# Patient Record
Sex: MALE | Race: BLACK OR AFRICAN AMERICAN | Employment: OTHER | URBAN - METROPOLITAN AREA
[De-identification: names, ages, dates, MRNs, and addresses within clinical notes are randomized per-mention and may not be internally consistent; named-entity substitution may affect disease eponyms.]

---

## 2019-04-24 ENCOUNTER — ANESTHESIA (OUTPATIENT)
Dept: ENDOSCOPY | Age: 64
End: 2019-04-24
Payer: MEDICARE

## 2019-04-24 ENCOUNTER — ANESTHESIA EVENT (OUTPATIENT)
Dept: ENDOSCOPY | Age: 64
End: 2019-04-24
Payer: MEDICARE

## 2019-04-24 ENCOUNTER — HOSPITAL ENCOUNTER (OUTPATIENT)
Age: 64
Setting detail: OUTPATIENT SURGERY
Discharge: HOME | End: 2019-04-24
Attending: INTERNAL MEDICINE | Admitting: INTERNAL MEDICINE
Payer: MEDICARE

## 2019-04-24 VITALS
WEIGHT: 198 LBS | SYSTOLIC BLOOD PRESSURE: 128 MMHG | OXYGEN SATURATION: 97 % | TEMPERATURE: 97.8 F | RESPIRATION RATE: 16 BRPM | DIASTOLIC BLOOD PRESSURE: 73 MMHG | HEART RATE: 68 BPM

## 2019-04-24 PROCEDURE — 76040000019: Performed by: INTERNAL MEDICINE

## 2019-04-24 PROCEDURE — 77030027957 HC TBNG IRR ENDOGTR BUSS -B: Performed by: INTERNAL MEDICINE

## 2019-04-24 PROCEDURE — 76060000031 HC ANESTHESIA FIRST 0.5 HR: Performed by: INTERNAL MEDICINE

## 2019-04-24 PROCEDURE — 74011250636 HC RX REV CODE- 250/636

## 2019-04-24 RX ORDER — PROPOFOL 10 MG/ML
INJECTION, EMULSION INTRAVENOUS AS NEEDED
Status: DISCONTINUED | OUTPATIENT
Start: 2019-04-24 | End: 2019-04-24 | Stop reason: HOSPADM

## 2019-04-24 RX ORDER — MIDAZOLAM HYDROCHLORIDE 1 MG/ML
.25-5 INJECTION, SOLUTION INTRAMUSCULAR; INTRAVENOUS
Status: DISCONTINUED | OUTPATIENT
Start: 2019-04-24 | End: 2019-04-24 | Stop reason: HOSPADM

## 2019-04-24 RX ORDER — DEXTROMETHORPHAN/PSEUDOEPHED 2.5-7.5/.8
1.2 DROPS ORAL
Status: DISCONTINUED | OUTPATIENT
Start: 2019-04-24 | End: 2019-04-24 | Stop reason: HOSPADM

## 2019-04-24 RX ORDER — FLUMAZENIL 0.1 MG/ML
0.2 INJECTION INTRAVENOUS
Status: DISCONTINUED | OUTPATIENT
Start: 2019-04-24 | End: 2019-04-24 | Stop reason: HOSPADM

## 2019-04-24 RX ORDER — ATROPINE SULFATE 0.1 MG/ML
0.5 INJECTION INTRAVENOUS
Status: DISCONTINUED | OUTPATIENT
Start: 2019-04-24 | End: 2019-04-24 | Stop reason: HOSPADM

## 2019-04-24 RX ORDER — SODIUM CHLORIDE 0.9 % (FLUSH) 0.9 %
5-40 SYRINGE (ML) INJECTION AS NEEDED
Status: DISCONTINUED | OUTPATIENT
Start: 2019-04-24 | End: 2019-04-24 | Stop reason: HOSPADM

## 2019-04-24 RX ORDER — DOXAZOSIN 4 MG/1
TABLET ORAL DAILY
COMMUNITY

## 2019-04-24 RX ORDER — SODIUM CHLORIDE 9 MG/ML
INJECTION, SOLUTION INTRAVENOUS
Status: DISCONTINUED | OUTPATIENT
Start: 2019-04-24 | End: 2019-04-24 | Stop reason: HOSPADM

## 2019-04-24 RX ORDER — SODIUM CHLORIDE 9 MG/ML
50 INJECTION, SOLUTION INTRAVENOUS CONTINUOUS
Status: DISCONTINUED | OUTPATIENT
Start: 2019-04-24 | End: 2019-04-24 | Stop reason: HOSPADM

## 2019-04-24 RX ORDER — LIDOCAINE HYDROCHLORIDE 20 MG/ML
INJECTION, SOLUTION EPIDURAL; INFILTRATION; INTRACAUDAL; PERINEURAL AS NEEDED
Status: DISCONTINUED | OUTPATIENT
Start: 2019-04-24 | End: 2019-04-24 | Stop reason: HOSPADM

## 2019-04-24 RX ORDER — FINASTERIDE 5 MG/1
5 TABLET, FILM COATED ORAL DAILY
COMMUNITY

## 2019-04-24 RX ORDER — SODIUM CHLORIDE 0.9 % (FLUSH) 0.9 %
5-40 SYRINGE (ML) INJECTION EVERY 8 HOURS
Status: DISCONTINUED | OUTPATIENT
Start: 2019-04-24 | End: 2019-04-24 | Stop reason: HOSPADM

## 2019-04-24 RX ORDER — EPINEPHRINE 0.1 MG/ML
1 INJECTION INTRACARDIAC; INTRAVENOUS
Status: DISCONTINUED | OUTPATIENT
Start: 2019-04-24 | End: 2019-04-24 | Stop reason: HOSPADM

## 2019-04-24 RX ORDER — NALOXONE HYDROCHLORIDE 0.4 MG/ML
0.4 INJECTION, SOLUTION INTRAMUSCULAR; INTRAVENOUS; SUBCUTANEOUS
Status: DISCONTINUED | OUTPATIENT
Start: 2019-04-24 | End: 2019-04-24 | Stop reason: HOSPADM

## 2019-04-24 RX ORDER — FENTANYL CITRATE 50 UG/ML
100 INJECTION, SOLUTION INTRAMUSCULAR; INTRAVENOUS
Status: DISCONTINUED | OUTPATIENT
Start: 2019-04-24 | End: 2019-04-24 | Stop reason: HOSPADM

## 2019-04-24 RX ADMIN — PROPOFOL 100 MG: 10 INJECTION, EMULSION INTRAVENOUS at 07:33

## 2019-04-24 RX ADMIN — PROPOFOL 50 MG: 10 INJECTION, EMULSION INTRAVENOUS at 07:39

## 2019-04-24 RX ADMIN — PROPOFOL 50 MG: 10 INJECTION, EMULSION INTRAVENOUS at 07:36

## 2019-04-24 RX ADMIN — PROPOFOL 50 MG: 10 INJECTION, EMULSION INTRAVENOUS at 07:47

## 2019-04-24 RX ADMIN — PROPOFOL 50 MG: 10 INJECTION, EMULSION INTRAVENOUS at 07:43

## 2019-04-24 RX ADMIN — SODIUM CHLORIDE: 9 INJECTION, SOLUTION INTRAVENOUS at 07:30

## 2019-04-24 RX ADMIN — LIDOCAINE HYDROCHLORIDE 40 MG: 20 INJECTION, SOLUTION EPIDURAL; INFILTRATION; INTRACAUDAL; PERINEURAL at 07:30

## 2019-04-24 NOTE — ANESTHESIA POSTPROCEDURE EVALUATION
Post-Anesthesia Evaluation and Assessment Patient: Julia Tejeda MRN: [id number]  SSN: [id number] YOB: 1955  Age: 75 y.o. Sex: male I have evaluated the patient and they are stable and ready for discharge from the PACU. Cardiovascular Function/Vital Signs Visit Vitals /78 Pulse 65 Temp 36.5 °C (97.7 °F) Resp 18 Wt 89.8 kg (198 lb) SpO2 97% Patient is status post MAC anesthesia for Procedure(s): 
COLONOSCOPY. Nausea/Vomiting: None Postoperative hydration reviewed and adequate. Pain: 
Pain Scale 1: Numeric (0 - 10) (04/24/19 0805) Pain Intensity 1: 0 (04/24/19 0805) Managed Neurological Status: At baseline Mental Status, Level of Consciousness: Alert and  oriented to person, place, and time Pulmonary Status:  
O2 Device: Room air (04/24/19 0805) Adequate oxygenation and airway patent Complications related to anesthesia: None Post-anesthesia assessment completed. No concerns Signed By: Violet Sumner MD   
 April 24, 2019 Procedure(s): 
COLONOSCOPY. MAC 
 
<BSHSIANPOST> Vitals Value Taken Time /79 4/24/2019  8:09 AM  
Temp 36.5 °C (97.7 °F) 4/24/2019  8:05 AM  
Pulse 63 4/24/2019  8:11 AM  
Resp 14 4/24/2019  8:11 AM  
SpO2 98 % 4/24/2019  8:11 AM  
Vitals shown include unvalidated device data.

## 2019-04-24 NOTE — PROCEDURES
[address]  [address]              :  Jamee Wise MD    Surgical Assistant: None    Implants: None    Referring Provider: Garlin Fothergill, MD    Sedation:  MAC anesthesia Propofol        Prior to the procedure its objectives, risks, consequences and alternatives were discussed with the patient who then elected to proceed. The patient had the opportunity to ask questions and those questions were answered. A physical exam was performed. The heart, lungs, and mental status were examined prior to the procedure and found to be satisfactory for conscious sedation and for the procedure. Conscious sedation was initiated by the physician. Continuous pulse oximetry and blood pressure monitoring were used throughout the procedure. After appropriate analgesia and rectal exam, the colonoscope was passed into the anus and passed to the cecum without difficulty. The prep was good. On slow withdrawal of the scope, the cecum, ascending colon, hepatic flexure, transverse colon, splenic flexure, descending colon, sigmoid and rectum were normal. Retroflexion exam of the rectum was normal. He tolerated the procedure without complication and I recommend a repeat colonoscopy in 10 years. Specimen none    Complications: None. EBL:  None.     Jamee Wise MD  4/24/2019  7:57 AM

## 2019-04-24 NOTE — DISCHARGE INSTRUCTIONS
[address]  174 [facility]          Hamlin Boxer  [id number]  1955    COLON DISCHARGE INSTRUCTIONS    DISCOMFORT:  Redness at IV site- apply warm compress to area; if redness or soreness persist- contact your physician  There may be a slight amount of blood passed from the rectum  Gaseous discomfort- walking, belching will help relieve any discomfort  You may not operate a vehicle for 12 hours  You may not engage in an occupation involving machinery or appliances for rest of today  You may not drink alcoholic beverages for at least 12 hours  Avoid making any critical decisions for at least 24 hour  DIET:   Regular diet. - however -  remember your colon is empty and a heavy meal will produce gas. Avoid these foods:  vegetables, fried / greasy foods, carbonated drinks for today         ACTIVITY:  You may resume your normal daily activities it is recommended that you spend the remainder of the day resting -  avoid any strenuous activity. CALL M.D. ANY SIGN OF:   Increasing pain, nausea, vomiting  Abdominal distension (swelling)  New increased bleeding (oral or rectal)  Fever (chills)  Pain in chest area  Bloody discharge from nose or mouth  Shortness of breath     Follow-up Instructions:   Call Dr. Jocelyne Gooden for any questions or problems. Telephone # [phone]  Should have a repeat colonoscopy in 10 years    Impression:  1. normal colon exam  Get Together Activation    Thank you for requesting access to Get Together. Please follow the instructions below to securely access and download your online medical record. Get Together allows you to send messages to your doctor, view your test results, renew your prescriptions, schedule appointments, and more. How Do I Sign Up? 1. In your internet browser, go to [email or web address]  2. Click on the First Time User? Click Here link in the Sign In box. You will be redirect to the New Member Sign Up page.   3. Enter your [facility] Access Code exactly as it appears below. You will not need to use this code after youve completed the sign-up process. If you do not sign up before the expiration date, you must request a new code. [facility] Access Code: [email or web address]  Expires: 2019  8:11 AM (This is the date your [facility] access code will )    4. Enter the last four digits of your Social Security Number (xxxx) and Date of Birth (mm/dd/yyyy) as indicated and click Submit. You will be taken to the next sign-up page. 5. Create a [employer] ID. This will be your [facility] login ID and cannot be changed, so think of one that is secure and easy to remember. 6. Create a [facility] password. You can change your password at any time. 7. Enter your Password Reset Question and Answer. This can be used at a later time if you forget your password. 8. Enter your e-mail address. You will receive e-mail notification when new information is available in [address]. 9. Click Sign Up. You can now view and download portions of your medical record. 10. Click the Download Summary menu link to download a portable copy of your medical information. Additional Information    If you have questions, please visit the Frequently Asked Questions section of the [facility] website at [email or web address] Remember, [facility] is NOT to be used for urgent needs. For medical emergencies, dial 911.

## 2019-04-24 NOTE — H&P
[address] 
[address] [facility], [address]  Henderson Claudia is a  75 y.o.  male who presents with no symptoms for screening . Past Medical History:  
Diagnosis Date  Hypertension History reviewed. No pertinent surgical history. No Known Allergies Visit Vitals /85 Pulse 71 Temp 98 °F (36.7 °C) Resp 12 Wt 89.8 kg (198 lb) SpO2 98% PHYSICAL EXAM: 
General: WD, WN. Alert, cooperative, no acute distress   
HEENT: NC, Atraumatic. PERRLA, EOMI. Anicteric sclerae. Mallampati score 2 Lungs:  CTA Bilaterally. No Wheezing/Rhonchi/Rales. Heart:  Regular  rhythm,  No murmur (), No Rubs, No Gallops Abdomen: Soft, Non distended, Non tender.  +Bowel sounds, no HSM Extremities: No c/c/e Neurologic:  CN 2-12 gi, Alert and oriented X 3. No acute neurological distress Psych:   Good insight. Not anxious nor agitated. Plan:  
Endoscopic procedure with MAC.  
 
Madeleine Boss MD 
4/24/2019  7:36 AM

## 2019-04-24 NOTE — PROGRESS NOTES
Alois Leyden 1955 
[id number] Situation: 
Verbal report received from:Megan Procedure: Procedure(s): 
COLONOSCOPY Background: 
 
Preoperative diagnosis: screening Postoperative diagnosis: 1. normal colon exam 
 
:  Dr. Juan F Vargas Assistant(s): Endoscopy RN-1: Cristal Calles RN Endoscopy RN-2: Cruz Katz RN Specimens: * No specimens in log * H. Pylori  no Assessment: 
Intra-procedure medications Anesthesia gave intra-procedure sedation and medications, see anesthesia flow sheet yes Intravenous fluids: NS@ Marceil Heck Vital signs stable Abdominal assessment: round and soft Recommendation: 
Discharge patient per MD order Family or Friend Permission to share finding with family or friend yes

## 2019-04-24 NOTE — PROGRESS NOTES

## 2023-02-14 ENCOUNTER — HOSPITAL ENCOUNTER (OUTPATIENT)
Dept: PREADMISSION TESTING | Age: 68
Discharge: HOME | End: 2023-02-14
Payer: MEDICARE

## 2023-02-14 VITALS
DIASTOLIC BLOOD PRESSURE: 80 MMHG | HEART RATE: 73 BPM | SYSTOLIC BLOOD PRESSURE: 156 MMHG | BODY MASS INDEX: 27.77 KG/M2 | TEMPERATURE: 97.7 F | WEIGHT: 205.03 LBS | HEIGHT: 72 IN

## 2023-02-14 LAB
ALBUMIN SERPL-MCNC: 3.9 G/DL (ref 3.5–5)
ALBUMIN/GLOB SERPL: 1.1 (ref 1.1–2.2)
ALP SERPL-CCNC: 58 U/L (ref 45–117)
ALT SERPL-CCNC: 25 U/L (ref 12–78)
ANION GAP SERPL CALC-SCNC: 9 MMOL/L (ref 5–15)
APPEARANCE UR: CLEAR
AST SERPL-CCNC: 23 U/L (ref 15–37)
ATRIAL RATE: 65 BPM
BACTERIA URNS QL MICRO: NEGATIVE /HPF
BILIRUB SERPL-MCNC: 0.8 MG/DL (ref 0.2–1)
BILIRUB UR QL: NEGATIVE
BUN SERPL-MCNC: 16 MG/DL (ref 6–20)
BUN/CREAT SERPL: 14 (ref 12–20)
CALCIUM SERPL-MCNC: 9.4 MG/DL (ref 8.5–10.1)
CALCULATED P AXIS, ECG09: 59 DEGREES
CALCULATED R AXIS, ECG10: 52 DEGREES
CALCULATED T AXIS, ECG11: 72 DEGREES
CHLORIDE SERPL-SCNC: 109 MMOL/L (ref 97–108)
CO2 SERPL-SCNC: 24 MMOL/L (ref 21–32)
COLOR UR: ABNORMAL
CREAT SERPL-MCNC: 1.18 MG/DL (ref 0.7–1.3)
DIAGNOSIS, 93000: NORMAL
EPITH CASTS URNS QL MICRO: ABNORMAL /LPF
GLOBULIN SER CALC-MCNC: 3.7 G/DL (ref 2–4)
GLUCOSE SERPL-MCNC: 109 MG/DL (ref 65–100)
GLUCOSE UR STRIP.AUTO-MCNC: NEGATIVE MG/DL
HGB UR QL STRIP: NEGATIVE
KETONES UR QL STRIP.AUTO: ABNORMAL MG/DL
LEUKOCYTE ESTERASE UR QL STRIP.AUTO: NEGATIVE
NITRITE UR QL STRIP.AUTO: NEGATIVE
P-R INTERVAL, ECG05: 190 MS
PH UR STRIP: 5.5 (ref 5–8)
POTASSIUM SERPL-SCNC: 4 MMOL/L (ref 3.5–5.1)
PROT SERPL-MCNC: 7.6 G/DL (ref 6.4–8.2)
PROT UR STRIP-MCNC: ABNORMAL MG/DL
Q-T INTERVAL, ECG07: 406 MS
QRS DURATION, ECG06: 82 MS
QTC CALCULATION (BEZET), ECG08: 422 MS
RBC #/AREA URNS HPF: ABNORMAL /HPF (ref 0–5)
SODIUM SERPL-SCNC: 142 MMOL/L (ref 136–145)
SP GR UR REFRACTOMETRY: 1.02 (ref 1–1.03)
UA: UC IF INDICATED,UAUC: ABNORMAL
UROBILINOGEN UR QL STRIP.AUTO: 1 EU/DL (ref 0.2–1)
VENTRICULAR RATE, ECG03: 65 BPM
WBC URNS QL MICRO: ABNORMAL /HPF (ref 0–4)

## 2023-02-14 PROCEDURE — 36415 COLL VENOUS BLD VENIPUNCTURE: CPT

## 2023-02-14 PROCEDURE — 81001 URINALYSIS AUTO W/SCOPE: CPT

## 2023-02-14 PROCEDURE — 93005 ELECTROCARDIOGRAM TRACING: CPT

## 2023-02-14 PROCEDURE — 80053 COMPREHEN METABOLIC PANEL: CPT

## 2023-02-14 PROCEDURE — 85025 COMPLETE CBC W/AUTO DIFF WBC: CPT

## 2023-02-14 RX ORDER — DOXAZOSIN 8 MG/1
8 TABLET ORAL EVERY MORNING
COMMUNITY

## 2023-02-14 NOTE — PERIOP NOTES
[address] INSTRUCTIONS    Surgery Date:   2/21/2023    Your surgeon's office or [facility] staff will call you between 4 PM- 8 PM the day before surgery with your arrival time. If your surgery is on a Monday, you will receive a call the preceding Friday. Please report to [facility] Patient Access/Admitting on the 1st floor. Bring your insurance card, photo identification, and any copayment ( if applicable). If you are going home the same day of your surgery, you must have a responsible adult to drive you home. You need to have a responsible adult to stay with you the first 24 hours after surgery and you should not drive a car for 24 hours following your surgery. Nothing to eat or drink after midnight the night before surgery. This includes no water, gum, mints, coffee, juice, etc.  Please note special instructions, if applicable, below for medications. Do NOT drink alcohol or smoke 24 hours before surgery. STOP smoking for 14 days prior as it helps with breathing and healing after surgery. If you are being admitted to the hospital, please leave personal belongings/luggage in your car until you have an assigned hospital room number. Please wear comfortable clothes. Wear your glasses instead of contacts. We ask that all money, jewelry and valuables be left at home. Wear no make up, particularly mascara, the day of surgery. All body piercings, rings, and jewelry need to be removed and left at home. Please remove any nail polish or artificial nails from your fingernails. Please wear your hair loose or down. Please no pony-tails, buns, or any metal hair accessories. If you shower the morning of surgery, please do not apply any lotions or powders afterwards. You may wear deodorant, unless having breast surgery. Do not shave any body area within 24 hours of your surgery. Please follow all instructions to avoid any potential surgical cancellation.   Should your physical condition change, (i.e. fever, cold, flu, etc.) please notify your surgeon as soon as possible. It is important to be on time. If a situation occurs where you may be delayed, please call:  [phone] / [phone] on the day of surgery. The Preadmission Testing staff can be reached at [phone]. Special instructions: N/A      Current Outpatient Medications   Medication Sig    doxazosin (Cardura) 8 mg tablet Take 8 mg by mouth Every morning. finasteride (PROSCAR) 5 mg tablet Take 5 mg by mouth nightly. No current facility-administered medications for this encounter. YOU MUST ONLY TAKE THESE MEDICATIONS THE MORNING OF SURGERY WITH A SIP OF WATER: N/A  MEDICATIONS TO TAKE THE MORNING OF SURGERY ONLY IF NEEDED: N/A  HOLD these prescription medications BEFORE Surgery: DOXAZOSIN, FINASTERIDE  Ask your surgeon/prescribing physician about when/if to STOP taking these medications: N/A  Stop all vitamins, herbal medicines and Aspirin containing products 7 days prior to surgery. Stop any non-steroidal anti-inflammatory drugs (i.e. Ibuprofen, Naproxen, Advil, Aleve) 3 days before surgery. You may take Tylenol. If you are currently taking Plavix, Coumadin, or any other blood-thinning/anticoagulant medication contact your prescribing physician for instructions. Preventing Infections Before and After - Your Surgery    IMPORTANT INSTRUCTIONS      You play an important role in your health and preparation for surgery. To reduce the germs on your skin you will need to shower with CHG soap (Chorhexidine gluconate 4%) two times before surgery. CHG soap (Hibiclens, Hex-A-Clens or store brand)  CHG soap will be provided at your Preadmission Testing (PAT) appointment. If you do not have a PAT appointment before surgery, you may arrange to  CHG soap from our office or purchase CHG soap at a pharmacy, grocery or department store. You need to purchase TWO 4 ounce bottles to use for your 2 showers.     Steps to follow:  Wash your hair with your normal shampoo and your body with regular soap and rinse well to remove shampoo and soap from your skin. Wet a clean washcloth and turn off the shower. Put CHG soap on washcloth and apply to your entire body from the neck down. Do not use on your head, face or private parts(genitals). Do not use CHG soap on open sores, wounds or areas of skin irritation. Wash you body gently for 5 minutes. Do not wash your skin too hard. This soap does not create lather. Pay special attention to your underarms and from your belly button to your feet. Turn the shower back on and rinse well to get CHG soap off your body. Pat your skin dry with a clean, dry towel. Do not apply lotions or moisturizer. Put on clean clothes and sleep on fresh bed sheets and do not allow pets to sleep with you. Shower with CHG soap 2 times before your surgery  The evening before your surgery  The morning of your surgery      Tips to help prevent infections after your surgery:  Protect your surgical wound from germs:  Hand washing is the most important thing you and your caregivers can do to prevent infections. Keep your bandage clean and dry! Do not touch your surgical wound. Use clean, freshly washed towels and washcloths every time you shower; do not share bath linens with others. Until your surgical wound is healed, wear clothing and sleep on bed linens each day that are clean and freshly washed. Do not allow pets to sleep in your bed with you or touch your surgical wound. Do not smoke - smoking delays wound healing. This may be a good time to stop smoking. If you have diabetes, it is important for you to manage your blood sugar levels properly before your surgery as well as after your surgery. Poorly managed blood sugar levels slow down wound healing and prevent you from healing completely.           Patient Information Regarding COVID Restrictions    Day of Procedure    Please park in the parking deck or any designated visitor parking lot. Enter the facility through the Main Entrance of the hospital.  On the day of surgery, please provide the cell phone number of the person who will be waiting for you to the Patient Access representative at the time of registration. Masks are highly recommended in the hospital, but not required. Once your procedure and the immediate recovery period is completed, a nurse in the recovery area will contact your designated visitor to inform them of your room number or to otherwise review other pertinent information regarding your care. Social distancing practices are strongly encouraged in waiting areas and the cafeteria. The patient was contacted  in person. He verbalized understanding of all instructions does not  need reinforcement.

## 2023-02-15 LAB
BASOPHILS # BLD: 0 K/UL (ref 0–0.1)
BASOPHILS NFR BLD: 0 % (ref 0–1)
DIFFERENTIAL METHOD BLD: ABNORMAL
EOSINOPHIL # BLD: 0 K/UL (ref 0–0.4)
EOSINOPHIL NFR BLD: 2 % (ref 0–7)
ERYTHROCYTE [DISTWIDTH] IN BLOOD BY AUTOMATED COUNT: 11.5 % (ref 11.5–14.5)
HCT VFR BLD AUTO: 41.2 % (ref 36.6–50.3)
HGB BLD-MCNC: 13.6 G/DL (ref 12.1–17)
IMM GRANULOCYTES # BLD AUTO: 0 K/UL
IMM GRANULOCYTES NFR BLD AUTO: 0 %
LYMPHOCYTES # BLD: 0.9 K/UL (ref 0.8–3.5)
LYMPHOCYTES NFR BLD: 44 % (ref 12–49)
MCH RBC QN AUTO: 30 PG (ref 26–34)
MCHC RBC AUTO-ENTMCNC: 33 G/DL (ref 30–36.5)
MCV RBC AUTO: 90.9 FL (ref 80–99)
MONOCYTES # BLD: 0.3 K/UL (ref 0–1)
MONOCYTES NFR BLD: 16 % (ref 5–13)
NEUTS SEG # BLD: 0.8 K/UL (ref 1.8–8)
NEUTS SEG NFR BLD: 38 % (ref 32–75)
NRBC # BLD: 0 K/UL (ref 0–0.01)
NRBC BLD-RTO: 0 PER 100 WBC
PATH REV BLD -IMP: ABNORMAL
PLATELET # BLD AUTO: 201 K/UL (ref 150–400)
PLATELET COMMENTS,PCOM: ABNORMAL
PMV BLD AUTO: 10.6 FL (ref 8.9–12.9)
RBC # BLD AUTO: 4.53 M/UL (ref 4.1–5.7)
RBC MORPH BLD: ABNORMAL
WBC # BLD AUTO: 2 K/UL (ref 4.1–11.1)
WBC MORPH BLD: ABNORMAL

## 2023-02-15 NOTE — PERIOP NOTES
CALL  PLACED TO DR. Michell Yee OFFICE, MESSAGE LEFT WITH  FOR NURSE RE: WBC 2.0; PAT PHONE NUMBER GIVEN FOR RETURN CALL IF NEEDED. UPDATE:  JODEE FROM DR. MEDINA'S OFFICE CALLED BACK, \"HE SIGNED OFF ON THE WBC OF 2.0, HE SAW IT\"

## 2023-02-20 ENCOUNTER — ANESTHESIA EVENT (OUTPATIENT)
Dept: SURGERY | Age: 68
DRG: 717 | End: 2023-02-20
Payer: MEDICARE

## 2023-02-21 ENCOUNTER — ANESTHESIA (OUTPATIENT)
Dept: SURGERY | Age: 68
DRG: 717 | End: 2023-02-21
Payer: MEDICARE

## 2023-02-21 ENCOUNTER — HOSPITAL ENCOUNTER (INPATIENT)
Age: 68
LOS: 1 days | Discharge: HOME | DRG: 717 | End: 2023-02-22
Attending: UROLOGY | Admitting: UROLOGY
Payer: MEDICARE

## 2023-02-21 DIAGNOSIS — Z90.79 H/O PROSTATECTOMY: Primary | ICD-10-CM

## 2023-02-21 PROBLEM — N40.0 BPH (BENIGN PROSTATIC HYPERPLASIA): Status: ACTIVE | Noted: 2023-02-21

## 2023-02-21 LAB
ABO + RH BLD: NORMAL
BLD PROD TYP BPU: NORMAL
BLD PROD TYP BPU: NORMAL
BLOOD GROUP ANTIBODIES SERPL: NORMAL
BPU ID: NORMAL
BPU ID: NORMAL
CROSSMATCH RESULT,%XM: NORMAL
CROSSMATCH RESULT,%XM: NORMAL
HCT VFR BLD AUTO: 35.7 % (ref 36.6–50.3)
HGB BLD-MCNC: 11.4 G/DL (ref 12.1–17)
HISTORY CHECKED?,CKHIST: NORMAL
SPECIMEN EXP DATE BLD: NORMAL
STATUS OF UNIT,%ST: NORMAL
STATUS OF UNIT,%ST: NORMAL
UNIT DIVISION, %UDIV: 0
UNIT DIVISION, %UDIV: 0

## 2023-02-21 PROCEDURE — 74011250636 HC RX REV CODE- 250/636: Performed by: NURSE ANESTHETIST, CERTIFIED REGISTERED

## 2023-02-21 PROCEDURE — 74011000250 HC RX REV CODE- 250: Performed by: NURSE ANESTHETIST, CERTIFIED REGISTERED

## 2023-02-21 PROCEDURE — 8E0W4CZ ROBOTIC ASSISTED PROCEDURE OF TRUNK REGION, PERCUTANEOUS ENDOSCOPIC APPROACH: ICD-10-PCS | Performed by: UROLOGY

## 2023-02-21 PROCEDURE — 74011250636 HC RX REV CODE- 250/636: Performed by: STUDENT IN AN ORGANIZED HEALTH CARE EDUCATION/TRAINING PROGRAM

## 2023-02-21 PROCEDURE — 2709999900 HC NON-CHARGEABLE SUPPLY: Performed by: UROLOGY

## 2023-02-21 PROCEDURE — 76060000038 HC ANESTHESIA 3.5 TO 4 HR: Performed by: UROLOGY

## 2023-02-21 PROCEDURE — 77030035277 HC OBTRTR BLDELSS DISP INTU -B: Performed by: UROLOGY

## 2023-02-21 PROCEDURE — 74011250637 HC RX REV CODE- 250/637: Performed by: STUDENT IN AN ORGANIZED HEALTH CARE EDUCATION/TRAINING PROGRAM

## 2023-02-21 PROCEDURE — 77030011243: Performed by: UROLOGY

## 2023-02-21 PROCEDURE — 74011000250 HC RX REV CODE- 250: Performed by: UROLOGY

## 2023-02-21 PROCEDURE — 77030005546 HC CATH URETH FOL 3W BARD -A: Performed by: UROLOGY

## 2023-02-21 PROCEDURE — P9045 ALBUMIN (HUMAN), 5%, 250 ML: HCPCS | Performed by: NURSE ANESTHETIST, CERTIFIED REGISTERED

## 2023-02-21 PROCEDURE — 76010000879 HC OR TIME 3.5 TO 4HR INTENSV - TIER 2: Performed by: UROLOGY

## 2023-02-21 PROCEDURE — 77030008756 HC TU IRR SUC STRY -B: Performed by: UROLOGY

## 2023-02-21 PROCEDURE — 88309 TISSUE EXAM BY PATHOLOGIST: CPT

## 2023-02-21 PROCEDURE — 77030040506 HC DRN WND MDII -A: Performed by: UROLOGY

## 2023-02-21 PROCEDURE — 77030008684 HC TU ET CUF COVD -B: Performed by: NURSE ANESTHETIST, CERTIFIED REGISTERED

## 2023-02-21 PROCEDURE — 36415 COLL VENOUS BLD VENIPUNCTURE: CPT

## 2023-02-21 PROCEDURE — 77030031492 HC PRT ACC BLNT AIRSEAL CNMD -B: Performed by: UROLOGY

## 2023-02-21 PROCEDURE — 77030020703 HC SEAL CANN DISP INTU -B: Performed by: UROLOGY

## 2023-02-21 PROCEDURE — 77030016151 HC PROTCTR LNS DFOG COVD -B: Performed by: UROLOGY

## 2023-02-21 PROCEDURE — 86923 COMPATIBILITY TEST ELECTRIC: CPT

## 2023-02-21 PROCEDURE — 86900 BLOOD TYPING SEROLOGIC ABO: CPT

## 2023-02-21 PROCEDURE — 65270000029 HC RM PRIVATE

## 2023-02-21 PROCEDURE — 77030002916 HC SUT ETHLN J&J -A: Performed by: UROLOGY

## 2023-02-21 PROCEDURE — 77030040361 HC SLV COMPR DVT MDII -B: Performed by: UROLOGY

## 2023-02-21 PROCEDURE — 85018 HEMOGLOBIN: CPT

## 2023-02-21 PROCEDURE — 77030013079 HC BLNKT BAIR HGGR 3M -A: Performed by: NURSE ANESTHETIST, CERTIFIED REGISTERED

## 2023-02-21 PROCEDURE — 77030002933 HC SUT MCRYL J&J -A: Performed by: UROLOGY

## 2023-02-21 PROCEDURE — G0378 HOSPITAL OBSERVATION PER HR: HCPCS

## 2023-02-21 PROCEDURE — 0VB00ZZ EXCISION OF PROSTATE, OPEN APPROACH: ICD-10-PCS | Performed by: UROLOGY

## 2023-02-21 PROCEDURE — 77030026438 HC STYL ET INTUB CARD -A: Performed by: NURSE ANESTHETIST, CERTIFIED REGISTERED

## 2023-02-21 PROCEDURE — 77030010507 HC ADH SKN DERMBND J&J -B: Performed by: UROLOGY

## 2023-02-21 PROCEDURE — 77030007955 HC PCH ENDOSC SPEC J&J -B: Performed by: UROLOGY

## 2023-02-21 PROCEDURE — 77030003578 HC NDL INSUF VERES AMR -B: Performed by: UROLOGY

## 2023-02-21 PROCEDURE — 74011250636 HC RX REV CODE- 250/636: Performed by: UROLOGY

## 2023-02-21 PROCEDURE — 76210000016 HC OR PH I REC 1 TO 1.5 HR: Performed by: UROLOGY

## 2023-02-21 PROCEDURE — 77030031139 HC SUT VCRL2 J&J -A: Performed by: UROLOGY

## 2023-02-21 PROCEDURE — 77030022704 HC SUT VLOC COVD -B: Performed by: UROLOGY

## 2023-02-21 PROCEDURE — 77030033162 HC PCH SPEC RTVR ENDOSC AMR -B: Performed by: UROLOGY

## 2023-02-21 PROCEDURE — 77030002966 HC SUT PDS J&J -A: Performed by: UROLOGY

## 2023-02-21 PROCEDURE — 77030019927 HC TBNG IRR CYSTO BAXT -A: Performed by: UROLOGY

## 2023-02-21 RX ORDER — DEXAMETHASONE SODIUM PHOSPHATE 4 MG/ML
INJECTION, SOLUTION INTRA-ARTICULAR; INTRALESIONAL; INTRAMUSCULAR; INTRAVENOUS; SOFT TISSUE AS NEEDED
Status: DISCONTINUED | OUTPATIENT
Start: 2023-02-21 | End: 2023-02-21 | Stop reason: HOSPADM

## 2023-02-21 RX ORDER — ONDANSETRON 2 MG/ML
4 INJECTION INTRAMUSCULAR; INTRAVENOUS
Status: DISCONTINUED | OUTPATIENT
Start: 2023-02-21 | End: 2023-02-22 | Stop reason: HOSPADM

## 2023-02-21 RX ORDER — DIPHENHYDRAMINE HYDROCHLORIDE 50 MG/ML
12.5 INJECTION, SOLUTION INTRAMUSCULAR; INTRAVENOUS
Status: DISCONTINUED | OUTPATIENT
Start: 2023-02-21 | End: 2023-02-22 | Stop reason: HOSPADM

## 2023-02-21 RX ORDER — MIDAZOLAM HYDROCHLORIDE 1 MG/ML
INJECTION, SOLUTION INTRAMUSCULAR; INTRAVENOUS AS NEEDED
Status: DISCONTINUED | OUTPATIENT
Start: 2023-02-21 | End: 2023-02-21 | Stop reason: HOSPADM

## 2023-02-21 RX ORDER — ACETAMINOPHEN 325 MG/1
650 TABLET ORAL ONCE
Status: COMPLETED | OUTPATIENT
Start: 2023-02-21 | End: 2023-02-21

## 2023-02-21 RX ORDER — SODIUM CHLORIDE 0.9 % (FLUSH) 0.9 %
5-40 SYRINGE (ML) INJECTION AS NEEDED
Status: DISCONTINUED | OUTPATIENT
Start: 2023-02-21 | End: 2023-02-22 | Stop reason: HOSPADM

## 2023-02-21 RX ORDER — FENTANYL CITRATE 50 UG/ML
INJECTION, SOLUTION INTRAMUSCULAR; INTRAVENOUS AS NEEDED
Status: DISCONTINUED | OUTPATIENT
Start: 2023-02-21 | End: 2023-02-21 | Stop reason: HOSPADM

## 2023-02-21 RX ORDER — HYDROMORPHONE HYDROCHLORIDE 2 MG/ML
INJECTION, SOLUTION INTRAMUSCULAR; INTRAVENOUS; SUBCUTANEOUS AS NEEDED
Status: DISCONTINUED | OUTPATIENT
Start: 2023-02-21 | End: 2023-02-21 | Stop reason: HOSPADM

## 2023-02-21 RX ORDER — PROPOFOL 10 MG/ML
INJECTION, EMULSION INTRAVENOUS AS NEEDED
Status: DISCONTINUED | OUTPATIENT
Start: 2023-02-21 | End: 2023-02-21 | Stop reason: HOSPADM

## 2023-02-21 RX ORDER — LORAZEPAM 2 MG/ML
1 INJECTION, SOLUTION INTRAMUSCULAR; INTRAVENOUS
Status: DISCONTINUED | OUTPATIENT
Start: 2023-02-21 | End: 2023-02-22 | Stop reason: HOSPADM

## 2023-02-21 RX ORDER — LIDOCAINE HYDROCHLORIDE 10 MG/ML
0.1 INJECTION, SOLUTION EPIDURAL; INFILTRATION; INTRACAUDAL; PERINEURAL AS NEEDED
Status: DISCONTINUED | OUTPATIENT
Start: 2023-02-21 | End: 2023-02-21 | Stop reason: HOSPADM

## 2023-02-21 RX ORDER — SODIUM CHLORIDE, SODIUM LACTATE, POTASSIUM CHLORIDE, CALCIUM CHLORIDE 600; 310; 30; 20 MG/100ML; MG/100ML; MG/100ML; MG/100ML
50 INJECTION, SOLUTION INTRAVENOUS CONTINUOUS
Status: DISCONTINUED | OUTPATIENT
Start: 2023-02-21 | End: 2023-02-21 | Stop reason: HOSPADM

## 2023-02-21 RX ORDER — ALBUMIN HUMAN 50 G/1000ML
SOLUTION INTRAVENOUS AS NEEDED
Status: DISCONTINUED | OUTPATIENT
Start: 2023-02-21 | End: 2023-02-21 | Stop reason: HOSPADM

## 2023-02-21 RX ORDER — FENTANYL CITRATE 50 UG/ML
25 INJECTION, SOLUTION INTRAMUSCULAR; INTRAVENOUS
Status: CANCELLED | OUTPATIENT
Start: 2023-02-21

## 2023-02-21 RX ORDER — SODIUM CHLORIDE 0.9 % (FLUSH) 0.9 %
5-40 SYRINGE (ML) INJECTION EVERY 8 HOURS
Status: DISCONTINUED | OUTPATIENT
Start: 2023-02-21 | End: 2023-02-22 | Stop reason: HOSPADM

## 2023-02-21 RX ORDER — HYDROMORPHONE HYDROCHLORIDE 1 MG/ML
1 INJECTION, SOLUTION INTRAMUSCULAR; INTRAVENOUS; SUBCUTANEOUS
Status: DISCONTINUED | OUTPATIENT
Start: 2023-02-21 | End: 2023-02-22 | Stop reason: HOSPADM

## 2023-02-21 RX ORDER — SODIUM CHLORIDE 0.9 % (FLUSH) 0.9 %
5-40 SYRINGE (ML) INJECTION AS NEEDED
Status: DISCONTINUED | OUTPATIENT
Start: 2023-02-21 | End: 2023-02-21 | Stop reason: HOSPADM

## 2023-02-21 RX ORDER — ONDANSETRON 2 MG/ML
INJECTION INTRAMUSCULAR; INTRAVENOUS AS NEEDED
Status: DISCONTINUED | OUTPATIENT
Start: 2023-02-21 | End: 2023-02-21 | Stop reason: HOSPADM

## 2023-02-21 RX ORDER — DOCUSATE SODIUM 100 MG/1
100 CAPSULE, LIQUID FILLED ORAL 2 TIMES DAILY
Status: DISCONTINUED | OUTPATIENT
Start: 2023-02-21 | End: 2023-02-22 | Stop reason: HOSPADM

## 2023-02-21 RX ORDER — SODIUM CHLORIDE 9 MG/ML
100 INJECTION, SOLUTION INTRAVENOUS CONTINUOUS
Status: DISCONTINUED | OUTPATIENT
Start: 2023-02-21 | End: 2023-02-22 | Stop reason: HOSPADM

## 2023-02-21 RX ORDER — SODIUM CHLORIDE 0.9 % (FLUSH) 0.9 %
5-40 SYRINGE (ML) INJECTION EVERY 8 HOURS
Status: DISCONTINUED | OUTPATIENT
Start: 2023-02-21 | End: 2023-02-21 | Stop reason: HOSPADM

## 2023-02-21 RX ORDER — NORETHINDRONE AND ETHINYL ESTRADIOL 0.5-0.035
KIT ORAL AS NEEDED
Status: DISCONTINUED | OUTPATIENT
Start: 2023-02-21 | End: 2023-02-21 | Stop reason: HOSPADM

## 2023-02-21 RX ORDER — SODIUM CHLORIDE 9 MG/ML
250 INJECTION, SOLUTION INTRAVENOUS AS NEEDED
Status: DISCONTINUED | OUTPATIENT
Start: 2023-02-21 | End: 2023-02-21 | Stop reason: HOSPADM

## 2023-02-21 RX ORDER — SODIUM CHLORIDE 0.9 % (FLUSH) 0.9 %
5-40 SYRINGE (ML) INJECTION EVERY 8 HOURS
Status: CANCELLED | OUTPATIENT
Start: 2023-02-21

## 2023-02-21 RX ORDER — NALOXONE HYDROCHLORIDE 0.4 MG/ML
0.4 INJECTION, SOLUTION INTRAMUSCULAR; INTRAVENOUS; SUBCUTANEOUS AS NEEDED
Status: DISCONTINUED | OUTPATIENT
Start: 2023-02-21 | End: 2023-02-22 | Stop reason: HOSPADM

## 2023-02-21 RX ORDER — LIDOCAINE HYDROCHLORIDE 20 MG/ML
INJECTION, SOLUTION EPIDURAL; INFILTRATION; INTRACAUDAL; PERINEURAL AS NEEDED
Status: DISCONTINUED | OUTPATIENT
Start: 2023-02-21 | End: 2023-02-21 | Stop reason: HOSPADM

## 2023-02-21 RX ORDER — KETAMINE HYDROCHLORIDE 10 MG/ML
INJECTION INTRAMUSCULAR; INTRAVENOUS AS NEEDED
Status: DISCONTINUED | OUTPATIENT
Start: 2023-02-21 | End: 2023-02-21 | Stop reason: HOSPADM

## 2023-02-21 RX ORDER — ACETAMINOPHEN 325 MG/1
650 TABLET ORAL
Status: DISCONTINUED | OUTPATIENT
Start: 2023-02-21 | End: 2023-02-22 | Stop reason: HOSPADM

## 2023-02-21 RX ORDER — PROPOFOL 10 MG/ML
INJECTION, EMULSION INTRAVENOUS
Status: DISCONTINUED | OUTPATIENT
Start: 2023-02-21 | End: 2023-02-21 | Stop reason: HOSPADM

## 2023-02-21 RX ORDER — HYDROMORPHONE HYDROCHLORIDE 1 MG/ML
0.2 INJECTION, SOLUTION INTRAMUSCULAR; INTRAVENOUS; SUBCUTANEOUS
Status: CANCELLED | OUTPATIENT
Start: 2023-02-21

## 2023-02-21 RX ORDER — HYDROCODONE BITARTRATE AND ACETAMINOPHEN 7.5; 325 MG/1; MG/1
1 TABLET ORAL
Status: DISCONTINUED | OUTPATIENT
Start: 2023-02-21 | End: 2023-02-22 | Stop reason: HOSPADM

## 2023-02-21 RX ORDER — BUPIVACAINE HYDROCHLORIDE 5 MG/ML
30 INJECTION, SOLUTION EPIDURAL; INTRACAUDAL ONCE
Status: DISCONTINUED | OUTPATIENT
Start: 2023-02-21 | End: 2023-02-21 | Stop reason: HOSPADM

## 2023-02-21 RX ORDER — SUCCINYLCHOLINE CHLORIDE 20 MG/ML
INJECTION INTRAMUSCULAR; INTRAVENOUS AS NEEDED
Status: DISCONTINUED | OUTPATIENT
Start: 2023-02-21 | End: 2023-02-21 | Stop reason: HOSPADM

## 2023-02-21 RX ORDER — ONDANSETRON 2 MG/ML
4 INJECTION INTRAMUSCULAR; INTRAVENOUS AS NEEDED
Status: CANCELLED | OUTPATIENT
Start: 2023-02-21

## 2023-02-21 RX ORDER — SODIUM CHLORIDE 0.9 % (FLUSH) 0.9 %
5-40 SYRINGE (ML) INJECTION AS NEEDED
Status: CANCELLED | OUTPATIENT
Start: 2023-02-21

## 2023-02-21 RX ORDER — PHENYLEPHRINE HCL IN 0.9% NACL 0.4MG/10ML
SYRINGE (ML) INTRAVENOUS AS NEEDED
Status: DISCONTINUED | OUTPATIENT
Start: 2023-02-21 | End: 2023-02-21 | Stop reason: HOSPADM

## 2023-02-21 RX ORDER — ROCURONIUM BROMIDE 10 MG/ML
INJECTION, SOLUTION INTRAVENOUS AS NEEDED
Status: DISCONTINUED | OUTPATIENT
Start: 2023-02-21 | End: 2023-02-21 | Stop reason: HOSPADM

## 2023-02-21 RX ORDER — BUPIVACAINE HYDROCHLORIDE 5 MG/ML
INJECTION, SOLUTION EPIDURAL; INTRACAUDAL AS NEEDED
Status: DISCONTINUED | OUTPATIENT
Start: 2023-02-21 | End: 2023-02-21 | Stop reason: HOSPADM

## 2023-02-21 RX ADMIN — WATER 2 G: 1 INJECTION INTRAMUSCULAR; INTRAVENOUS; SUBCUTANEOUS at 12:58

## 2023-02-21 RX ADMIN — SODIUM CHLORIDE 100 ML/HR: 9 INJECTION, SOLUTION INTRAVENOUS at 17:00

## 2023-02-21 RX ADMIN — Medication 80 MCG: at 13:48

## 2023-02-21 RX ADMIN — ALBUMIN (HUMAN) 250 ML: 12.5 INJECTION, SOLUTION INTRAVENOUS at 14:00

## 2023-02-21 RX ADMIN — ALBUMIN (HUMAN) 250 ML: 12.5 INJECTION, SOLUTION INTRAVENOUS at 14:24

## 2023-02-21 RX ADMIN — ROCURONIUM BROMIDE 20 MG: 10 SOLUTION INTRAVENOUS at 13:03

## 2023-02-21 RX ADMIN — PROPOFOL 50 MCG/KG/MIN: 10 INJECTION, EMULSION INTRAVENOUS at 15:31

## 2023-02-21 RX ADMIN — EPHEDRINE SULFATE 10 MG: 50 INJECTION INTRAVENOUS at 13:11

## 2023-02-21 RX ADMIN — Medication 25 MG: at 13:05

## 2023-02-21 RX ADMIN — SUGAMMADEX 200 MG: 100 INJECTION, SOLUTION INTRAVENOUS at 16:00

## 2023-02-21 RX ADMIN — ROCURONIUM BROMIDE 15 MG: 10 SOLUTION INTRAVENOUS at 14:55

## 2023-02-21 RX ADMIN — EPHEDRINE SULFATE 10 MG: 50 INJECTION INTRAVENOUS at 13:51

## 2023-02-21 RX ADMIN — MIDAZOLAM 2 MG: 1 INJECTION INTRAMUSCULAR; INTRAVENOUS at 12:29

## 2023-02-21 RX ADMIN — ROCURONIUM BROMIDE 5 MG: 10 SOLUTION INTRAVENOUS at 12:42

## 2023-02-21 RX ADMIN — PROPOFOL 150 MG: 10 INJECTION, EMULSION INTRAVENOUS at 12:42

## 2023-02-21 RX ADMIN — LIDOCAINE HYDROCHLORIDE 80 MG: 20 INJECTION, SOLUTION EPIDURAL; INFILTRATION; INTRACAUDAL; PERINEURAL at 12:42

## 2023-02-21 RX ADMIN — ONDANSETRON HYDROCHLORIDE 4 MG: 2 INJECTION, SOLUTION INTRAMUSCULAR; INTRAVENOUS at 12:52

## 2023-02-21 RX ADMIN — ROCURONIUM BROMIDE 15 MG: 10 SOLUTION INTRAVENOUS at 14:05

## 2023-02-21 RX ADMIN — ROCURONIUM BROMIDE 45 MG: 10 SOLUTION INTRAVENOUS at 12:45

## 2023-02-21 RX ADMIN — HYDROMORPHONE HYDROCHLORIDE 0.5 MG: 2 INJECTION, SOLUTION INTRAMUSCULAR; INTRAVENOUS; SUBCUTANEOUS at 16:00

## 2023-02-21 RX ADMIN — SODIUM CHLORIDE, POTASSIUM CHLORIDE, SODIUM LACTATE AND CALCIUM CHLORIDE: 600; 310; 30; 20 INJECTION, SOLUTION INTRAVENOUS at 15:34

## 2023-02-21 RX ADMIN — SODIUM CHLORIDE, POTASSIUM CHLORIDE, SODIUM LACTATE AND CALCIUM CHLORIDE 50 ML/HR: 600; 310; 30; 20 INJECTION, SOLUTION INTRAVENOUS at 11:23

## 2023-02-21 RX ADMIN — FENTANYL CITRATE 50 MCG: 50 INJECTION, SOLUTION INTRAMUSCULAR; INTRAVENOUS at 12:42

## 2023-02-21 RX ADMIN — DEXAMETHASONE SODIUM PHOSPHATE 4 MG: 4 INJECTION, SOLUTION INTRAMUSCULAR; INTRAVENOUS at 12:52

## 2023-02-21 RX ADMIN — PHENYLEPHRINE HYDROCHLORIDE 20 MCG/MIN: 10 INJECTION INTRAVENOUS at 13:35

## 2023-02-21 RX ADMIN — SUCCINYLCHOLINE CHLORIDE 160 MG: 20 INJECTION, SOLUTION INTRAMUSCULAR; INTRAVENOUS at 12:42

## 2023-02-21 RX ADMIN — ACETAMINOPHEN 650 MG: 325 TABLET ORAL at 11:24

## 2023-02-21 NOTE — PERIOP NOTES
TRANSFER - OUT REPORT:    Verbal report given to [facility] RN(name) on Diana Boudreaux  being transferred to (unit) for routine post - op       Report consisted of patients Situation, Background, Assessment and   Recommendations(SBAR). Time Pre op antibiotic given:Y  Anesthesia Stop time: Y    Information from the following report(s) SBAR was reviewed with the receiving nurse. Opportunity for questions and clarification was provided. Is the patient on 02? NO       L/Min        Other     Is the patient on a monitor? NO    Is the nurse transporting with the patient? NO    Surgical Waiting Area notified of patient's transfer from PACU? YES      The following personal items collected during your admission accompanied patient upon transfer:   Dental Appliance: Dental Appliances: None  Vision: Visual Aid: Glasses  Hearing Aid:    Jewelry: Jewelry: None  Clothing: Clothing: Belt, Footwear, Pants, Shirt, Socks, Undergarments  Other Valuables:  Other Valuables: Eyeglasses (with wife)  Valuables sent to safe:

## 2023-02-21 NOTE — OP NOTES
Operative Note    Patient: Juan Lundberg  YOB: 1955  MRN: [id number]    Date of Procedure: 2/21/2023     Pre-Op Diagnosis: Severe prostate enlargement with refractory voiding symptoms and elevated PSA    Post-Op Diagnosis: Same as preoperative diagnosis. Procedure(s):  ROBOTIC ASSISTED SUBTOTAL PROSTATECTOMY    Surgeon(s):  MD Addy Puckett MD    Surgical Assistant: Surg Asst-1: Alicia Higgins    Anesthesia: General     Estimated Blood Loss (mL):  462    Complications: None    Specimens:   ID Type Source Tests Collected by Time Destination   1 : PROSTATE ADENOMA Fresh Prostate  Addy Roy MD 2/21/2023 1522 Pathology        Implants: * No implants in log *    Drains: * No LDAs found *    Findings: Good bilateral resection. 24 catheter three-way with 30 mL in balloon nicely obstructed the resection site. Irrigation with mild hematuria with gentle CBI. Good wound closure. Detailed Description of Procedure:   OPERATIVE REPORT    INDICATIONS: Full preoperative discussion regarding significant obstructive symptoms with very large prostate refractory to medical management. Additional concerns regarding elevated PSA were fully addressed and outlined. PROCEDURE: The patient was brought to the operating room, placed in the supine position. Time out was performed. General anesthesia was induced and preoperative antibiotics given. The patient was then placed in the dorsal lithotomy position, in our typical prostatectomy preoperative position. This included the lower extremities in Yellofins. Upper extremities tucked and a strap across his chest. All pressure points were padded. All extremities in neutral position, avoidance of compression of nerves, etc. At this point, the patient was then placed into full Trendelenburg position to ensure that there was no slipping or movement. He was then returned to a neutral positioning.   After that was performed, the patient was then prepped and draped in a standard fashion, including the abdomen, groin, and genitalia. Then a supraumbilical incision was made, carried down through the subcutaneous tissue, and a Veress needle was utilized, placed into the intra-abdominal space, verified by aspiration, instillation, and drop test.  After this was performed, insufflation was then started. Initial opening pressures were less than 10. Once the abdomen was fully insufflated, we then placed a 8mm robotic trocar through the supraumbilical incision. Camera was placed through, checking to ensure there were no injuries to the bowel,mesentery, or vasculature. A typical robotic configuration with 2 right-sided robotic ports lateral to the umbilicus,  by approximately 8 cm each, and then on the left side a robotic port, and then more laterally a 12mm Airseal assistant port. These were placed under direct vision with no injury to bowel. The Huang catheter was utilized to insufflate the bladder to allow for assessment of bladder volume and planned incision site. A cystotomy was then made opening up and allowing for visualization of the intravesicle space. Notably there was no identifiable bladder tumor stones or other pathology. A very large prostate with predominant intravesical lateral lobes bilateral noted. Evaluation of the trigone demonstrated the ureter on each side. These were carefully visualized. Notably the left side had a fair amount of vascularity that made visualization a bit difficult but allowed for an appropriate landmark. Once we felt comfortable with identification of the orifice on each side a incision was made on the median lobe of prostate dissecting into adenoma to allow for delineation of the adenoma and capsule. Blunt dissection with as needed sharp dissection was then utilized. Point cautery was utilized.   As we moved laterally great care was taken to ensure that the ureteral orifice on each side was well spared from our dissection site. Initially predominant posterior and right lateral dissection was performed of the base and mid portions. This was carried around following the adenoma to the 12:00/anterior plane. Once the base and mid portions of been taken the anterior commissure was followed and incised. This allowed delineation of the right apical prostate and following along the adenoma delineation and dissection of the apical segment allowed for the right lateral lobe to be flipped out. Once the apex was grasped and pulled the posterior segments were more easily identified. Inspection of the area demonstrated no identifiable inappropriate dissection. There is no evidence of bowel injury or other pathologies. Hemostasis was achieved as best possible but would later be revisited. The right lateral lobe was freed from the left lateral lobe by incising at the 6 o'clock position and carefully avoiding the bladder mucosa. Once the right lateral lobe had been freed it was placed into the intra-abdominal space for later recovery. Attention was then turned to the remaining left lateral lobe. In a similar fashion dissection was started at the base 6:00/posterior surface. This worked to the left lateral aspect following along the adenoma. Continuing along to the anterior surface allowed for delineation of the left base aspect. Further dissection into the mid and apical portions went fairly smoothly with as needed coagulation. Identification of the capsule was noted. Once the apical segment had been outlined nicely then this was once again able to be grasped and flipped allowing for release of the left apical segment. Some small attachments were taken down. Inspection of the fossa demonstrated no significant residual adenoma and no identified significant capsular perforation, bowel injury or other concerning pathology.   Both the right and left portions of the prostate were in the intra-abdominal space for later recovery. Careful irrigation of the fossa and inspection demonstrated no identifiable concerns. Hemostasis was achieved with cautery as well as a running 3 oh V-Loc stitch along the 5 and 7:00 positions. Once this was completed inspection of the bladder and ureteral orifice on each side demonstrated no incidental pathology. A 24 Kazakh catheter with 30 mL balloon was then placed. The balloon was inflated and brought down to the prostatic fossa and appeared to occlude it quite reasonably. We left 30 mL in the balloon and began closure of the cystotomy with 2 oh V-Loc sutures. This was performed in a careful fashion allowing for mucosal approximation. A second layer was then placed after the bladder closure was tested to approximate the seromuscular layers. Great care was taken to avoid capturing the catheter during closure. The bladder was irrigated and the site was tested demonstrating no leak on several occasions. At this point we had a correct instrument needle and sponge counts. We had the right and left lateral lobe of the prostate that were placed in the bag for later removal.  Inspection of the intra-abdominal space demonstrated no identifiable active bleeding, incidental bowel injury or other concerning changes or findings. At this point all instruments were removed and the robot was undocked. All incision sites were inspected and demonstrated no active bleeding. The left lower quadrant air seal site appeared within good limits without need for closure. Through the supraumbilical site the specimen was removed by widening the site to approximately 6 cm in size. Once this was completed copious local anesthetic was utilized. #1 PDS was then utilized for closure of the fascia in a running fashion. The drain was stitched into position in the right lower quadrant incision site. All skin sites were closed with Monocryl stitches and Dermabond.     At this point, the patient was awoke and brought to the PACU.         Electronically Signed by Dirk Harris MD on 2/21/2023 at 3:59 PM

## 2023-02-21 NOTE — ROUTINE PROCESS
Patient: Joaquín King MRN: [id number]  SSN: [id number]   YOB: 1955  Age: 69 y.o. Sex: male     Patient is status post Procedure(s):  ROBOTIC ASSISTED SUBTOTAL PROSTATECTOMY. Surgeon(s) and Role:     * Olesya Mckeon MD - Primary     * Ibrahima Almanza MD - Assisting    Local/Dose/Irrigation:  See [facility]                  Peripheral IV 02/21/23 Left Forearm (Active)   Site Assessment Clean, dry, & intact 02/21/23 1121   Phlebitis Assessment 0 02/21/23 1121   Infiltration Assessment 0 02/21/23 1121   Dressing Status Clean, dry, & intact; New 02/21/23 1121   Dressing Type Transparent;Tape 02/21/23 1121   Hub Color/Line Status Green 02/21/23 1121            Airway - Endotracheal Tube 02/21/23 Oral (Active)                   Dressing/Packing:  Incision 02/21/23 Abdomen-Dressing/Treatment: Surgical glue;Gauze dressing/dressing sponge;Tegaderm/Transparent film dressing (02/21/23 1500)    Splint/Cast:  ]

## 2023-02-21 NOTE — BRIEF OP NOTE
Brief Postoperative Note    Patient: Maribell Barrera  YOB: 1955  MRN: [id number]    Date of Procedure: 2/21/2023     Pre-Op Diagnosis: SEVERE prostate enlargement with refractory voiding symptoms and elevated PSA    Post-Op Diagnosis: Same as preoperative diagnosis. Procedure(s):  ROBOTIC ASSISTED SUBTOTAL PROSTATECTOMY    Surgeon(s):  MD Keesha Balderrama MD    Surgical Assistant: Surg Asst-1: Lisandro Leavitt    Anesthesia: General     Estimated Blood Loss (mL): 791    Complications: None    Specimens:   ID Type Source Tests Collected by Time Destination   1 : PROSTATE ADENOMA Fresh Prostate  Keesha Tsang MD 2/21/2023 1522 Pathology        Implants: * No implants in log *    Drains: * No LDAs found *    Findings: Large intravesical portion of prostate with additional lateral lobes within the pelvis. Excellent bilateral resection. 24 three-way Huang catheter with 30 mL in balloon appeared to obstruct the resected area. Gentle CBI after closure.     Electronically Signed by Elizabeth Montalvo MD on 2/21/2023 at 3:55 PM

## 2023-02-21 NOTE — ANESTHESIA POSTPROCEDURE EVALUATION
Post-Anesthesia Evaluation and Assessment    Patient: Ericka Whyte MRN: [id number]  SSN: [id number]    YOB: 1955  Age: 69 y.o. Sex: male      I have evaluated the patient and they are stable and ready for discharge from the PACU. Cardiovascular Function/Vital Signs  Visit Vitals  /76   Pulse 85   Temp 36.4 °C (97.5 °F)   Resp 14   Ht 6' (1.829 m)   Wt 93 kg (205 lb)   SpO2 99%   BMI 27.80 kg/m²       Patient is status post General anesthesia for Procedure(s):  ROBOTIC ASSISTED SUBTOTAL PROSTATECTOMY. Nausea/Vomiting: None    Postoperative hydration reviewed and adequate. Pain:  Pain Scale 1: Numeric (0 - 10) (02/21/23 1108)  Pain Intensity 1: 0 (02/21/23 1108)   Managed    Neurological Status:   Neuro (WDL): Within Defined Limits (02/21/23 1115)   At baseline    Mental Status, Level of Consciousness: Alert and  oriented to person, place, and time    Pulmonary Status:   Adequate oxygenation and airway patent    Complications related to anesthesia: None    Post-anesthesia assessment completed. No concerns    Signed By: Jolly Arango DO     February 21, 2023                       Procedure(s):  ROBOTIC ASSISTED SUBTOTAL PROSTATECTOMY. general    <BSHSIANPOST>    INITIAL Post-op Vital signs:   Vitals Value Taken Time   /80 02/21/23 1645   Temp 36.4 °C (97.5 °F) 02/21/23 1611   Pulse 86 02/21/23 1646   Resp 16 02/21/23 1646   SpO2 99 % 02/21/23 1646   Vitals shown include unvalidated device data.

## 2023-02-21 NOTE — H&P
Patient known to Dr. James Don with history of BPH. Available records 2022 are noted. PSA 4.5/9 August 2022. Notably 2007 PSA 11 with negative biopsy and volume 140 g. MRI prostate October 2022 showed 450 g prostate. Indeterminant lymphadenopathy in the pelvis. Also indeterminate enhancement right lesser trochanter possible underlying bone lesion. AUA 12/35 mixed. MRI images reviewed. Detailed with patient my concerns about his current situation. The prostate takes of the vast majority of the bladder. He has continued significant voiding symptoms despite the prior AUA score of 12/35 as documented at a prior visit. He has episodes where he will have to urinate every 15 minutes ongoing. These are sporadic occurring a few times every year. They have altered his day-to-day life significantly as he feels worried about traveling etc.  I expressed the concern that if he does start to have hematuria due to UTI or other that it would be a very difficult treatment due to the extreme size of his prostate making cystoscopy of limited value. This could lead to a urgent significant intervention and potential significant morbidity and mortality. This was balanced with the patient regarding the downsides of surgery including specific attention to risk for bleeding and potential mortality as well as likelihood of bladder dysfunction and incontinence.  _________  PLAN    BPH-severe enlargement with estimated volume 450 g. Potential for significant complications from intervention are outlined including significant risk for bleeding and subsequent voiding dysfunction. Currently managed on finasteride and doxazosin (primarily for BP). Elevated PSA-in light of his prostate size, PSA density (0.019) is reasonably low. Prior documented prostate biopsy 2007 negative with PSA of 11. Recent MRI prostate without identifiable pathology. .    After careful discussion and consideration patient wishes to proceed forward with robotic subtotal prostatectomy. Elevated risk for bleeding. Type and cross 2 units. 5-hour surgical time. Patient understands potential for prolonged stay, increased complication rate, need for prolonged indwelling catheter etc.  The patient is to undergo robotic subtotal prostatectomy. Risks, benefits, and alternatives were discussed. Risks/complications include (not limited to) bowel injury, rectal fistula, vascular injury, conversion to open, recurrence of disease, BNC, incontinence/urine leak, ED/change in ejaculation, robot malfunction/conversion to open procedure. MI, stroke, death discussed. We will plan to do a CUG 1 week postoperatively to rule out extravasation/urine leak. PAST MEDICAL HISTORY:    Allergies: No known allergies. DENIES: Latex, Shellfish, X-Ray Dye, Iodine. Medications: doxazosin 8 mg tablet (doxazosin) Take 1 tablet by mouth at bedtime   finasteride 5 mg tablet (finasteride)     Problems: ELEVATED PSA (ICD-790.93) (RTS18-X80.2)  BPH WITH URINARY OBSTRUCION (ICD-600.00) (RXC18-X86.0)    Illnesses: High Blood Pressure. DENIES: Heart Disease, Pacemaker/Defibrillator, Lung Disease, Diabetes, Bowel Problems, Stroke/Seizure, Kidney Problems, Bleeding Problems, HIV, Hepatitis, or Cancer. Surgeries: Prostate Biopsy andColonoscopy. Family History: DENIES: Prostate cancer, Kidney cancer, Kidney disease, Kidney stones. Social History: . Smoking status: Never. Does not drink alcohol.          EXAMINATION: Vitals: Pulse: 89 BP: 150/75 Weight: 209 lbs Height: 6' 0\" BMI: 28.45 kg/m^2  Jada: well-developed NAD  Eyes: WNL  Ext Ears/Nose: WNL no lesions or deformities   Hear: grossly intact  RespEff: breath easily   L Extr: no edema   ABD: soft NT ND no mass; no CVAT  Irene: no hernia   Gait: WNL  Dig/Nails: no club,cyan,petechiae  Skin: warm and dry  Palp: no SQ nodularity  Sens: no sens def  Judg: intact  Mood/Aff: WNL          URINALYSIS    PSA HISTORY  4.53 ng/ml on 08/12/2022  11.0 ng/ml on 04/21/2007  9.4 ng/ml on 02/16/2007        The patient was given a verbal/written log of Blood Pressure and recommendations. Moderate Hypertension: Instruct patient to have prompt (1-2 weeks) BP followup. cc: Jacqueline Branham M.D.   Transcribed by Speech [employer]  [employer]  E&M Service    Upcoming Orders  Schedule Non-ASC Surgery

## 2023-02-22 VITALS
TEMPERATURE: 98.7 F | SYSTOLIC BLOOD PRESSURE: 135 MMHG | BODY MASS INDEX: 27.77 KG/M2 | HEART RATE: 94 BPM | DIASTOLIC BLOOD PRESSURE: 78 MMHG | RESPIRATION RATE: 16 BRPM | WEIGHT: 205 LBS | HEIGHT: 72 IN | OXYGEN SATURATION: 93 %

## 2023-02-22 LAB
ANION GAP SERPL CALC-SCNC: 8 MMOL/L (ref 5–15)
BUN SERPL-MCNC: 13 MG/DL (ref 6–20)
BUN/CREAT SERPL: 11 (ref 12–20)
CALCIUM SERPL-MCNC: 8.4 MG/DL (ref 8.5–10.1)
CHLORIDE SERPL-SCNC: 111 MMOL/L (ref 97–108)
CO2 SERPL-SCNC: 21 MMOL/L (ref 21–32)
CREAT SERPL-MCNC: 1.2 MG/DL (ref 0.7–1.3)
GLUCOSE SERPL-MCNC: 132 MG/DL (ref 65–100)
HCT VFR BLD AUTO: 33.5 % (ref 36.6–50.3)
HGB BLD-MCNC: 10.9 G/DL (ref 12.1–17)
HGB BLD-MCNC: 12.4 G/DL (ref 12.1–17)
HGB BLD-MCNC: 12.5 G/DL (ref 12.1–17)
POTASSIUM SERPL-SCNC: 4.5 MMOL/L (ref 3.5–5.1)
SODIUM SERPL-SCNC: 140 MMOL/L (ref 136–145)

## 2023-02-22 PROCEDURE — 85018 HEMOGLOBIN: CPT

## 2023-02-22 PROCEDURE — 80048 BASIC METABOLIC PNL TOTAL CA: CPT

## 2023-02-22 PROCEDURE — 74011000250 HC RX REV CODE- 250: Performed by: UROLOGY

## 2023-02-22 PROCEDURE — G0378 HOSPITAL OBSERVATION PER HR: HCPCS

## 2023-02-22 PROCEDURE — 74011250636 HC RX REV CODE- 250/636: Performed by: UROLOGY

## 2023-02-22 PROCEDURE — 36415 COLL VENOUS BLD VENIPUNCTURE: CPT

## 2023-02-22 PROCEDURE — 77010033678 HC OXYGEN DAILY

## 2023-02-22 PROCEDURE — 74011250637 HC RX REV CODE- 250/637: Performed by: UROLOGY

## 2023-02-22 RX ORDER — DOXAZOSIN 8 MG/1
4 TABLET ORAL EVERY MORNING
Qty: 30 TABLET | Refills: 0 | Status: SHIPPED | OUTPATIENT
Start: 2023-02-22

## 2023-02-22 RX ORDER — CEFUROXIME AXETIL 500 MG/1
500 TABLET ORAL 2 TIMES DAILY
Qty: 18 TABLET | Refills: 0 | Status: SHIPPED | OUTPATIENT
Start: 2023-02-22 | End: 2023-03-03

## 2023-02-22 RX ORDER — OXYCODONE HYDROCHLORIDE 5 MG/1
5 TABLET ORAL
Qty: 12 TABLET | Refills: 0 | Status: SHIPPED | OUTPATIENT
Start: 2023-02-22 | End: 2023-03-01

## 2023-02-22 RX ADMIN — DOCUSATE SODIUM 100 MG: 100 CAPSULE, LIQUID FILLED ORAL at 08:54

## 2023-02-22 RX ADMIN — CEFAZOLIN 2 G: 1 INJECTION, POWDER, FOR SOLUTION INTRAMUSCULAR; INTRAVENOUS at 05:52

## 2023-02-22 NOTE — DISCHARGE INSTRUCTIONS
POST OPERATIVE INSTRUCTIONS    FOLLOW-UP VISIT    We will see you back in the office in 1 week. At that time your final pathology report will be reviewed with you. Your Huang catheter will be evaluated for removal at that time. You will be re-educated on male kegel exercises to strengthen your pelvic muscles. This exercise is felt to hasten your recovery of urinary continence. Virtually everyone has leakage of urine upon removal of the catheter and recovery time is variable and everyone is different. Please be patient. Please bring an adult urinary pad (such as Depend Guards) with you on this appointment. DISCHARGE MEDICATIONS    Upon discharge you will be given prescriptions for pain control    Most patients experience only minimal discomfort after this surgery. We recommend using Tylenol (acetaminophen) for pain first and reserve the narcotic pain medication as an alternative as it tends to cause constipation. You may resume your normal medications upon discharge from the hospital with the exception of any blood thinning products (such as coumadin or aspirin). ACTIVITY    Please refrain from driving for the 1st week after your surgery. You may shower upon discharge from the hospital. Avoid bathtubs, hot tubs or swimming pools during 1st 2 weeks. It is important to be mobile during your recovery period. Avoid sitting in one position for longer than 45 minutes to 1 hour. Walking and climbing stairs are OK. Be careful not to overdo it. Avoid any heavy lifting or strenuous activity for the first 2 weeks. Most patients ease into normal activities (including employment) after 2 weeks of recuperation. Most patients resume driving by the 2nd week. Please use your best judgment. CATHETER CARE    Keep your [facility] urinary catheter below the level of your bladder at all times otherwise it may not drain properly. The leg bag can be fitted under your trousers for daytime use.  The larger overnight bag will hold more urine and is best reserved for bedtime use. Minimal care of this unit is required. Make sure there is slack on the catheter between your penis and the drainage bag. This should not be on any tension. Empty the bag when full. You may disconnect the urinary drainage bag when showering and let the catheter drain freely. A topical antibiotic ointment applied to the catheter as inserts into the penis will reduce discomfort from the catheter. This can be obtained over the counter at your local pharmacy. Do not perform the male kegel exercises while the urinary catheter is in place. CARE OF YOUR INCISION SITES    Application of ointments or creams to the incision sites is not recommended. The adhesive clear wound dressing will slough off naturally in 5 - 10 days  Do not pick at or apply ointments/medications to the adhesive dressing  Do not scrub, soak or expose incisions to prolonged moisture. MALE KEGEL EXERCISES    Once your [facility] urinary catheter is removed it will be safe to start these exercises. Your surgery removed your prostate and affected your secondary urinary control mechanisms. Your external sphincter must now take all the responsibility for keeping you dry and continent. It will take time and effort to strengthen this mechanism. How do you do Kegel exercises? The first step is to find the right muscles. Imagine that you are trying to stop yourself from passing gas. Squeeze the muscles you would use. If you sense a \"pulling\" feeling, those are the right muscles for pelvic exercises. It is important not to squeeze other muscles at the same time and not to hold your breath. Also, be careful not to tighten your stomach, leg, or buttock muscles. Squeezing the wrong muscles can put more pressure on your bladder control muscles. Squeeze just the pelvic muscles. Repeat, but do not overdo it. Pull in the pelvic muscles and hold for a count of 3.  Then relax for a count of 3. Work up to [employer] of 10 repeats. Be patient. Do not give up. It takes just 5 minutes, three to five times a day. Your bladder control may not improve for 3 to 6 weeks, although most people notice an improvement after a few weeks. DIET     Please avoid carbonated beverages and any other gas producing foods (such as flour, beans, or broccoli) for the 1st week or so. Small meals are best until bowel habits return to normal.    THINGS YOU MAY ENCOUNTER AFTER SURGERY    Bruising around incision sites. Not at all uncommon and should not alarm you. This will resolve with time. Abdominal distention, constipation or bloating. Make sure you are following the dietary instructions. If you dont have a bowel movement or pass gas or are feeling uncomfortable 24 hours after surgery, try taking Milk of Magnesia as directed on the bottle. If after two doses of Milk of Magnesia, you still have not had a bowel movement, it is safe to use a Dulcolax suppository (available over the counter at your local pharmacy). Scrotal/Penile swelling and bruising. This is quite common and should not alarm you. It may appear immediately after surgery or may start 4 -5 days after surgery. It should resolve in about 7 - 14 days. You may try elevating your scrotum with a small towel or washcloth when lying down. Bloody drainage around thorne catheter or in the urine. This is especially common after increased activity or following a bowel movement. Do not be alarmed. Resting for a short period and drinking plenty of fluids usually improves the situation. Leaking urine around Thorne catheter. This is not unusual.  The catheter is not perfect, but most of the urine should flow through the catheter into the drainage bag. Bladder spasms. It is not uncommon with the catheter in and even after the catheter comes out to have bladder spasms. You may feel mild to severe bladder pain or cramping.   You may also experience the sudden urge to urinate or a burning sensation when you urinate. Call your MD if this persists without relief. Perineal pain (pain between your rectum and scrotum)/Testicular discomfort. This may last for several weeks after surgery, but it will resolve. Call your MD if the pain medication does not alleviate this. Lower legs/ankle swelling. This is not abnormal with it occurs in both legs and should not alarm you. It should resolve in about 7 - 14 days. Elevation of your legs while sitting will help. CONTACT MD IMMEDIATELY IF YOU ARE EXPERIENCING ANY OF THE FOLLOWING SYMPTOMS:    Oral Temperature over 101° F. Urine stops draining from Huang into drainage bag. Any pain not relieved by pain medication prescribed. Nausea/Vomiting. Large amount of blood clots in urine that are blocking the catheter from draining. Bladder spasms that are not relieved with pain medication. Uneven swelling of legs or ankles. Redness and/or large amount of swelling around your incision sites. Excessive bleeding or drainage from your incision sites.         [id number]  [phone]

## 2023-02-22 NOTE — PROGRESS NOTES
Reason for Admission:  admitted for planned robotic prostatectomy. Elevated PSA. RUR Score:    2%-Low               Plan for utilizing home health:   No skilled need. PCP: First and Last name:  Jordan Keen MD    Are you a current patient: Yes/No: YES  Approximate date of last visit:   Can you participate in a virtual visit with your PCP: YES                 Current Advanced Directive/Advance Care Plan: Full Code  Healthcare Decision Maker:              Primary Decision Maker: Kerrie Olson - [phone]                  Transition of Care Plan:  Post op day 1 robotic prostatectomy    Per surgery needs to ambulate and will likely discharge later today. Family will likely transport. Medicare pt has received, reviewed, and signed 2nd IM letter informing them of their right to appeal the discharge. Signed copy has been placed on pt bedside chart. Care Management Interventions  PCP Verified by CM:  Yes  Last Visit to PCP: 02/08/23  Palliative Care Criteria Met (RRAT>21 & CHF Dx)?: No  Support Systems: Spouse/Significant Other  Confirm Follow Up Transport: Family  The Patient and/or Patient Representative was Provided with a Choice of Provider and Agrees with the Discharge Plan?:  ([address] [place] [address] Dr Sosa)  [employer] Information Provided?: No  Discharge Location  Patient Expects to be Discharged to[de-identified] Home with family assistance

## 2023-02-22 NOTE — PROGRESS NOTES
Progress Note    Patient: Rosalind Car MRN: [id number]  SSN: [id number]    YOB: 1955  Age: 69 y.o. Sex: male          ADMITTED:  2023 to Poonam Aquino MD  for BPH (benign prostatic hyperplasia) [N40.0]           Rosalind Car is 1 Day Post-Op Procedure(s):  ROBOTIC ASSISTED SUBTOTAL PROSTATECTOMY. He is doing well. He has no pain. He has no nausea. He is tolerating a solid diet. Denies flatus. He has not been up out of bed yet. No CP, cough, SOB, BLE pain. Indwelling catheter is draining well. Urine light fruit punch off CBI. Hgb stable. RADHA drain with only 20 cc SS output. Vitals:  Temp (24hrs), Av °F (36.7 °C), Min:97.5 °F (36.4 °C), Max:98.7 °F (37.1 °C)     Blood pressure 135/78, pulse 94, temperature 98.7 °F (37.1 °C), resp. rate 16, height 6' (1.829 m), weight 93 kg (205 lb), SpO2 93 %. I&O's:   1901 -  0700  In: 33394 [P.O.:200; I.V.:2850]  Out: 68821 [DJVRB:27312; Drains:40]    0701 -  1900  In: 3000   Out: 3600 [Urine:3600]     Exam:   Alert, NAD. abdomen soft, mildly distended, hypoactive bowel sounds, appropriately TTP at surgical sites. All incisions clean/dry/intact without evidence of infection. RADHA with serosanguinous drainage. Huang with clear light red urine output. Labs:   Recent Labs     23  0358 23  1635   HGB 10.9* 11.4*   HCT 33.5* 35.7*     Recent Labs     23  0358      K 4.5   *   CO2 21   *   BUN 13   CREA 1.20   CA 8.4*        Cultures:        Imaging:       Assessment:     - 1 Day Post-Op Procedure(s):  ROBOTIC ASSISTED SUBTOTAL PROSTATECTOMY    Active Problems:    BPH (benign prostatic hyperplasia) (2023)        Plan:     - Progressing well. Needs to mobilize. Anticipated discharge home this afternoon. Will re-evaluate. 12:28 PM  Pt re-evaluated. Ambulated in the liang and tolerated well. Reports small BM.  Huang draining bright red urine after ambulation. I hand irrigated the catheter and not clots were appreciated. Urine clearing. Stable for dc home with thorne. Remove RADHA drain. DC teaching reviewed and all questions answered.      Supervising MD, Dr. Argelia Vogt By: Beatriz Ortiz, MASOUD - February 22, 2023

## 2023-02-22 NOTE — DISCHARGE SUMMARY
Urology Discharge Summary    Patient: Iveth Osei MRN: [id number]  SSN: [id number]    YOB: 1955  Age: 69 y.o. Sex: male               ADMISSION:  to Malena Bray MD by Alice Porter MD  2/21/2023 ADMISSION DIAGNOSIS: BPH (benign prostatic hyperplasia) [N40.0]  2/22/2023 DISCHARGE DIAGNOSIS: [x]    Same  CONSULTS: None  PROCEDURES: POD# 1 Day Post-Op Procedure(s):  ROBOTIC ASSISTED SUBTOTAL PROSTATECTOMY    RECENT LABS: [unfilled]     [facility] COURSE: [x]    Uncomplicated.      COMPLICATIONS: [x]    None identified  DISCHARGE TO:     [x]    Home  []    Rehab []    SNF  FOLLOWUP: one week  DISCHARGE MEDS:     [x]    IT IS INTENDED THAT YOU CONTINUE TO TAKE YOUR PRIOR MEDICATIONS WITHOUT CHANGES WITH THE EXCEPTION OF:  []    NONE    [unfilled]      Cuong Frederick NP 2/22/2023 12:27 PM

## 2023-02-24 NOTE — PROGRESS NOTES
Physician Progress Note      PATIENT:               Kalie Black  CSN #:                  [id number]  :                       1955  ADMIT DATE:       2023 10:01 AM  DISCH DATE:        2023 1:50 PM  RESPONDING  PROVIDER #:        Flor Benoit NP          QUERY TEXT:    Pt admitted with robotic subtotal prostatectomy. Noted documentation of abnormal HGB. If possible, please document in progress notes and discharge summary:    The medical record reflects the following:  Risk Factors: anemia    Clinical Indicators:   H&P; The patient is to undergo robotic subtotal prostatectomy   ROBOTIC ASSISTED SUBTOTAL PROSTATECTOMY   Discharge summary: HOSPITAL COURSE: Uncomplicated.   HGB 12.4  11.4  10.9    Treatment:  monitor labs    Thank you,  Angela Ambrosio RN [facility]  Clinical Documentation  [phone] or via [address] [facility] [address] provided:  -- acute blood loss anemia not clinically significant  -- acute blood loss anemia is clinically significant  -- Other - I will add my own diagnosis  -- Disagree - Not applicable / Not valid  -- Disagree - Clinically unable to determine / Unknown  -- Refer to Clinical Documentation Reviewer    PROVIDER RESPONSE TEXT:    This patient has clinically significant acute blood loss anemia    Query created by: Randy Arana on 2023 8:33 AM      Electronically signed by:  Flor Benoit NP 2023 12:26 PM

## (undated) DEVICE — TAPE,CLOTH/SILK,CURAD,3"X10YD,LF,40/CS: Brand: CURAD

## (undated) DEVICE — SUTURE SZ 0 27IN 5/8 CIR UR-6  TAPER PT VIOLET ABSRB VICRYL J603H

## (undated) DEVICE — PAD PT POS 36 IN SURGYPAD DISP

## (undated) DEVICE — SOLUTION IRRIG 3000ML 0.9% SOD CHL USP UROMATIC PLAS CONT

## (undated) DEVICE — SEAL UNIV 5-8MM DISP BX/10 -- DA VINCI XI - SNGL USE

## (undated) DEVICE — INSUFFLATION NEEDLE TO ESTABLISH PNEUMOPERITONEUM.: Brand: INSUFFLATION NEEDLE

## (undated) DEVICE — Z DUPLICATE USE 2246581 COVER MPLR TIP CRV SCIS ACC DA VINCI

## (undated) DEVICE — DAVINCI PROSTATECTOMY-SMH: Brand: MEDLINE INDUSTRIES, INC.

## (undated) DEVICE — SUTURE PDS II SZ 1 L27IN ABSRB VLT CT-1 L36MM 1/2 CIR Z341H

## (undated) DEVICE — ENDO CARRY-ON PROCEDURE KIT INCLUDES ENZYMATIC SPONGE, GAUZE, BIOHAZARD LABEL, TRAY, LUBRICANT, DIRTY SCOPE LABEL, WATER LABEL, TRAY, DRAWSTRING PAD, AND DEFENDO 4-PIECE KIT.: Brand: ENDO CARRY-ON PROCEDURE KIT

## (undated) DEVICE — DEVICE SECUREMENT 1/32IN POLYETH FOAM F ANCHR URIN CATH

## (undated) DEVICE — TISSUE RETRIEVAL SYSTEM: Brand: INZII RETRIEVAL SYSTEM

## (undated) DEVICE — AIRLIFE™ U/CONNECT-IT OXYGEN TUBING 7 FEET (2.1 M) CRUSH-RESISTANT OXYGEN TUBING, VINYL TIPPED: Brand: AIRLIFE™

## (undated) DEVICE — SOLUTION IRRIGATION H2O 0797305] ICU MEDICAL INC]

## (undated) DEVICE — BW-412T DISP COMBO CLEANING BRUSH: Brand: SINGLE USE COMBINATION CLEANING BRUSH

## (undated) DEVICE — BAG BELONG PT PERS CLEAR HANDL

## (undated) DEVICE — SET ADMIN 16ML TBNG L100IN 2 Y INJ SITE IV PIGGY BK DISP

## (undated) DEVICE — ABSORBABLE WOUND CLOSURE DEVICE: Brand: V-LOC 90

## (undated) DEVICE — Z DISCONTINUED NO SUB IDED SET EXTN W/ 4 W STPCOCK M SPIN LOK 36IN

## (undated) DEVICE — VISUALIZATION SYSTEM: Brand: CLEARIFY

## (undated) DEVICE — Device: Brand: MEDICAL ACTION INDUSTRIES

## (undated) DEVICE — BAG SPEC REM 224ML W4XL6IN DIA10MM 1 HND GYN DISP ENDOPCH

## (undated) DEVICE — Z DISCONTINUED USE 2751540 TUBING IRRIG L10IN DISP PMP ENDOGATOR

## (undated) DEVICE — DRAIN WND 19FR 1/4 FULL-FLUTED --

## (undated) DEVICE — CATH IV AUTOGRD BC BLU 22GA 25 -- INSYTE

## (undated) DEVICE — DRAPE ARM BX/20 -- DA VINCI XI

## (undated) DEVICE — SUTURE VCRL SZ 0 L36IN ABSRB VLT L36MM CT-1 1/2 CIR J346H

## (undated) DEVICE — PAD BD MATTRESS 73X32 IN STD CONVOLUTED FOAM LTX FREE

## (undated) DEVICE — 1200 GUARD II KIT W/5MM TUBE W/O VAC TUBE: Brand: GUARDIAN

## (undated) DEVICE — DISPOSABLE SUCTION/IRRIGATOR TUBE SET: Brand: AHTO

## (undated) DEVICE — Device

## (undated) DEVICE — ADHESIVE SKIN CLSR 0.7ML TOP DERMBND ADV

## (undated) DEVICE — CONNECTOR TBNG AUX H2O JET DISP FOR OLY 160/180 SER

## (undated) DEVICE — GARMENT,MEDLINE,DVT,INT,CALF,MED, GEN2: Brand: MEDLINE

## (undated) DEVICE — KENDALL RADIOLUCENT FOAM MONITORING ELECTRODE -RECTANGULAR SHAPE: Brand: KENDALL

## (undated) DEVICE — SUTURE MCRYL SZ 4-0 L27IN ABSRB UD L19MM PS-2 1/2 CIR PRIM Y426H

## (undated) DEVICE — AIRSEAL 12 MM ACCESS PORT AND PALM GRIP OBTURATOR WITH BLADELESS OPTICAL TIP, 120 MM LENGTH: Brand: AIRSEAL

## (undated) DEVICE — JPH 10FR RND, FULL DUCT,NO TRO: Brand: CARDINAL HEALTH

## (undated) DEVICE — TRI-LUMEN FILTERED TUBE SET WITH ACTIVATED CHARCOAL FILTER: Brand: AIRSEAL

## (undated) DEVICE — Y-TYPE TUR/BLADDER IRRIGATION SET, REGULATING CLAMP

## (undated) DEVICE — QUILTED PREMIUM COMFORT UNDERPAD,EXTRA HEAVY: Brand: WINGS

## (undated) DEVICE — ELECTRO LUBE IS A SINGLE PATIENT USE DEVICE THAT IS INTENDED TO BE USED ON ELECTROSURGICAL ELECTRODES TO REDUCE STICKING.: Brand: KEY SURGICAL ELECTRO LUBE

## (undated) DEVICE — CATHETER URETH 24FR BLLN 30CC STD LTX 3 W TWO OPP DRNGE EYE

## (undated) DEVICE — HYPODERMIC SAFETY NEEDLE: Brand: MONOJECT

## (undated) DEVICE — GLOVE ORANGE PI 7 1/2   MSG9075

## (undated) DEVICE — OBTRTR BLDELSS 8MM DISP -- DA VINCI - SNGL USE

## (undated) DEVICE — SYRINGE MED 30ML STD CLR PLAS LUERLOCK TIP N CTRL DISP

## (undated) DEVICE — SOLIDIFIER FLUID 3000 CC ABSORB

## (undated) DEVICE — NEEDLE HYPO 18GA L1.5IN PNK S STL HUB POLYPR SHLD REG BVL

## (undated) DEVICE — PACK,BASIC,SIRUS,V: Brand: MEDLINE

## (undated) DEVICE — BLADE ASSEMB CLP HAIR FINE --

## (undated) DEVICE — SYRINGE MED 20ML STD CLR PLAS LUERLOCK TIP N CTRL DISP

## (undated) DEVICE — SUTURE NONABSORBABLE MONOFILAMENT 2-0 FS 18 IN ETHILON 664H

## (undated) DEVICE — RESERVOIR,SUCTION,100CC,SILICONE: Brand: MEDLINE